# Patient Record
Sex: FEMALE | Race: BLACK OR AFRICAN AMERICAN | Employment: UNEMPLOYED | URBAN - METROPOLITAN AREA
[De-identification: names, ages, dates, MRNs, and addresses within clinical notes are randomized per-mention and may not be internally consistent; named-entity substitution may affect disease eponyms.]

---
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
## 2025-05-13 ENCOUNTER — HOSPITAL ENCOUNTER (EMERGENCY)
Age: 20
Discharge: HOME | End: 2025-05-13
Attending: EMERGENCY MEDICINE
Payer: MEDICAID

## 2025-05-13 VITALS
SYSTOLIC BLOOD PRESSURE: 112 MMHG | RESPIRATION RATE: 18 BRPM | HEART RATE: 80 BPM | OXYGEN SATURATION: 100 % | DIASTOLIC BLOOD PRESSURE: 59 MMHG | TEMPERATURE: 98.4 F

## 2025-05-13 DIAGNOSIS — Z20.2 POSSIBLE EXPOSURE TO STI: Primary | ICD-10-CM

## 2025-05-13 LAB
CANDIDA SPECIES: NEGATIVE
GARDNERELLA VAGINALIS: POSITIVE
HCG SERPL QL: NEGATIVE
HIV 1+2 AB+HIV1 P24 AG SERPL QL IA: NONREACTIVE
SOURCE: ABNORMAL
T PALLIDUM AB SER QL IA: NONREACTIVE
TRICHOMONAS: NEGATIVE

## 2025-05-13 PROCEDURE — 87480 CANDIDA DNA DIR PROBE: CPT

## 2025-05-13 PROCEDURE — 87389 HIV-1 AG W/HIV-1&-2 AB AG IA: CPT

## 2025-05-13 PROCEDURE — 84703 CHORIONIC GONADOTROPIN ASSAY: CPT

## 2025-05-13 PROCEDURE — 87660 TRICHOMONAS VAGIN DIR PROBE: CPT

## 2025-05-13 PROCEDURE — 87510 GARDNER VAG DNA DIR PROBE: CPT

## 2025-05-13 PROCEDURE — 87591 N.GONORRHOEAE DNA AMP PROB: CPT

## 2025-05-13 PROCEDURE — 87491 CHLMYD TRACH DNA AMP PROBE: CPT

## 2025-05-13 PROCEDURE — 99283 EMERGENCY DEPT VISIT LOW MDM: CPT

## 2025-05-13 PROCEDURE — 86780 TREPONEMA PALLIDUM: CPT

## 2025-05-13 ASSESSMENT — ENCOUNTER SYMPTOMS
ABDOMINAL PAIN: 0
NAUSEA: 0
SHORTNESS OF BREATH: 0
VOMITING: 0
BACK PAIN: 0
DIARRHEA: 0

## 2025-05-13 NOTE — ED NOTES
Patient presents to ED for vaginal discharge and concern for STD  Reports that she does not have an Obgyn that she follows with   Denies any concern for pregnancy  Patient a/o x 4 with NAD noted

## 2025-05-13 NOTE — ED PROVIDER NOTES
[facility] EMERGENCY DEPARTMENT     Emergency Department     Faculty Attestation    I performed a history and physical examination of the patient and discussed management with the resident. I reviewed the resident’s note and agree with the documented findings and plan of care. Any areas of disagreement are noted on the chart. I was personally present for the key portions of any procedures. I have documented in the chart those procedures where I was not present during the key portions. I have reviewed the emergency nurses triage note. I agree with the chief complaint, past medical history, past surgical history, allergies, medications, social and family history as documented unless otherwise noted below. For Physician Assistant/ Nurse Practitioner cases/documentation I have personally evaluated this patient and have completed at least one if not all key elements of the E/M (history, physical exam, and MDM). Additional findings are as noted.    3:42 PM EDT    Patient presents requesting STD check.  She states that she has been having some vaginal discharge for a while.  She denies any abdominal pain.  She denies any vaginal pain or discomfort.  She denies any abnormal bleeding.  She denies dysuria.  On exam, patient is resting comfortably in the bed and appears well.  Abdomen is soft and nontender.  The resident perform a pelvic exam.  Will check labs.      Jackie Valdez MD  Attending Emergency  Physician

## 2025-05-13 NOTE — ED PROVIDER NOTES
[facility] EMERGENCY DEPARTMENT  Emergency Department Encounter  Emergency Medicine Resident     Pt Name:Esme Barber  MRN: [id number]  Birthdate 2005  Date of evaluation: 5/13/25  PCP:  Georgina Ferrara APRN - CNP  Note Started: 3:02 PM EDT      CHIEF COMPLAINT       Chief Complaint   Patient presents with    Exposure to STD       HISTORY OF PRESENT ILLNESS  (Location/Symptom, Timing/Onset, Context/Setting, Quality, Duration, Modifying Factors, Severity.)      Esme Barber is a 19 y.o. female who presents to the ED with c/o STI exposure.  Patient states she is sexually active with 1 male partner and does use protection.  She has been having intermittent vaginal discharge for several months.  Patient has not seen any provider for the symptoms.  She came in today to \"get tested for everything.\"  Patient states she \"wants to keep up with this.\"  Patient does not have a PCP or OB/GYN that she follows with.  She denies any fever, chest pain, shortness of breath, abdominal pain, nausea/vomiting, vaginal bleeding, genital lesions/rash, dysuria, hematuria.  Last menstrual period was 5/4.    PAST MEDICAL / SURGICAL / SOCIAL / FAMILY HISTORY      has no past medical history on file.     has no past surgical history on file.    Social History     Socioeconomic History    Marital status: Single     Spouse name: Not on file    Number of children: Not on file    Years of education: Not on file    Highest education level: Not on file   Occupational History    Not on file   Tobacco Use    Smoking status: Never    Smokeless tobacco: Never   Vaping Use    Vaping status: Never Used   Substance and Sexual Activity    Alcohol use: Yes    Drug use: Yes     Types: Marijuana (Weed)     Comment: daily    Sexual activity: Yes     Partners: Male   Other Topics Concern    Not on file   Social History Narrative    Not on file     Social Drivers of Health     Financial Resource Strain: Not on file   Food Insecurity:

## 2025-05-13 NOTE — DISCHARGE INSTRUCTIONS
You have been evaluated due to exposure to sexually transmitted infection.  At this time, you have declined to wait for your test results to come back.  [facility]  If they come back positive, please go to [facility] practice, OB/GYN, or come back to the ER for treatment.    Return to the emergency department if you develop fever, abdominal pain, new or worsening vaginal symptoms, urinary symptoms such as pain or burning when you urinate, or any other concerns.

## 2025-05-14 LAB
C TRACH DNA SPEC QL PROBE+SIG AMP: NEGATIVE
N GONORRHOEA DNA SPEC QL PROBE+SIG AMP: ABNORMAL
SPECIMEN DESCRIPTION: ABNORMAL

## 2025-05-15 ENCOUNTER — TELEPHONE (OUTPATIENT)
Age: 20
End: 2025-05-15

## 2025-05-15 NOTE — TELEPHONE ENCOUNTER
Medication Management Service ([facility])  CLINICAL PHARMACY NOTE:  Telephone Follow-up for Positive STD Test    At the time of Esme Barber's visit to [facility] Emergency Department on 05/13/2025 STD testing was performed. DNA testing was positive for Gonorrhea.       Pregnancy status: not pregnant.    Attempt made to reach patient by telephone to review results and need to return for antibiotic therapy initiation. Phone number was not accepting calls at the time of outreach, so pharmacist was unable to leave a voicemail for the patient. Will attempt to reach out to patient later today.    Chino Stewart (Robbie), PharmD  PGY2 Ambulatory Care Pharmacy Resident  [facility] Medication Management Service  [phone]  05/15/25  =======================================  For Pharmacy Admin Tracking Only    Total # of Interventions Recommended: 0  Total # of Interventions Accepted: 0  Time Spent (min): 10

## 2025-05-16 ENCOUNTER — TELEPHONE (OUTPATIENT)
Age: 20
End: 2025-05-16

## 2025-05-16 NOTE — TELEPHONE ENCOUNTER
Attempt made to reach patient by telephone to review results of STD testing.  Left voice message for return call to [phone].

## 2025-05-16 NOTE — TELEPHONE ENCOUNTER
CLINICAL PHARMACY NOTE:  Telephone Follow-up for Positive STD Test    At the time of Esme Barber's visit to [facility] Emergency Department on 2025 STD testing was performed. DNA testing was positive for  Gonorrhea.   Patient also tested positive for GARDNERELLA VAGINALIS     Pregnancy status:  negative.      Unable to reach patient by phone. Sent letter to patient on 2025 regarding need to start antibiotic therapy and receive an injection to treat the infection. Advised patient to go to the [facility], their local urgent care/ED, or return to the [place] Emergency Department for treatment.     Kim PAREDES. Ph., CACP, Clinical Pharmacist  Anticoagulation Services, [facility]  2025  9:28 AM      For Pharmacy Admin Tracking Only    Intervention Detail: Adherence Monitorin  Total # of Interventions Recommended: 1  Total # of Interventions Accepted: 1  Time Spent (min): 15

## 2025-06-12 ENCOUNTER — HOSPITAL ENCOUNTER (EMERGENCY)
Age: 20
Discharge: HOME | End: 2025-06-12
Attending: EMERGENCY MEDICINE
Payer: COMMERCIAL

## 2025-06-12 VITALS
TEMPERATURE: 98.4 F | SYSTOLIC BLOOD PRESSURE: 115 MMHG | RESPIRATION RATE: 18 BRPM | DIASTOLIC BLOOD PRESSURE: 66 MMHG | HEART RATE: 103 BPM | OXYGEN SATURATION: 100 %

## 2025-06-12 DIAGNOSIS — L91.0 KELOID: Primary | ICD-10-CM

## 2025-06-12 PROCEDURE — 99283 EMERGENCY DEPT VISIT LOW MDM: CPT

## 2025-06-13 NOTE — ED PROVIDER NOTES
[facility] EMERGENCY DEPARTMENT  Emergency Department Encounter  Emergency Medicine Resident     Pt Name:Esme Barber  MRN: [id number]  Birthdate 2005  Date of evaluation: 6/12/25  PCP:  Georgina Ferrara APRN - CNP  Note Started: 10:54 PM EDT      CHIEF COMPLAINT       No chief complaint on file.      HISTORY OF PRESENT ILLNESS  (Location/Symptom, Timing/Onset, Context/Setting, Quality, Duration, Modifying Factors, Severity.)      Esme Barber is a 19 y.o. female who presents with concern for possible infection over tattoo.  Patient states tattoo was done over 2 years ago.  Has been experiencing raised growth from tattoo which started about 1.5 years ago.  States tattoo is itchy.  Denies fever, chills, purulent drainage, redness, swelling, nausea, vomiting.    PAST MEDICAL / SURGICAL / SOCIAL / FAMILY HISTORY      has no past medical history on file.       has no past surgical history on file.      Social History     Socioeconomic History    Marital status: Single     Spouse name: Not on file    Number of children: Not on file    Years of education: Not on file    Highest education level: Not on file   Occupational History    Not on file   Tobacco Use    Smoking status: Never    Smokeless tobacco: Never   Vaping Use    Vaping status: Never Used   Substance and Sexual Activity    Alcohol use: Yes    Drug use: Yes     Types: Marijuana (Weed)     Comment: daily    Sexual activity: Yes     Partners: Male   Other Topics Concern    Not on file   Social History Narrative    Not on file     Social Drivers of Health     Financial Resource Strain: Not on file   Food Insecurity: No Food Insecurity (2/19/2025)    Received from [facility]    Hunger Screening     Within the past 12 months we worried whether our food would run out before we got money to buy more.: Never True     Within the past 12 months the food we bought just didn't last and we didn't have money to get more.: Never True

## 2025-06-13 NOTE — ED PROVIDER NOTES
[facility] EMERGENCY DEPARTMENT     Emergency Department     Faculty Attestation        I performed a history and physical examination of the patient and discussed management with the resident. I reviewed the resident’s note and agree with the documented findings and plan of care. Any areas of disagreement are noted on the chart. I was personally present for the key portions of any procedures. I have documented in the chart those procedures where I was not present during the key portions. I have reviewed the emergency nurses triage note. I agree with the chief complaint, past medical history, past surgical history, allergies, medications, social and family history as documented unless otherwise noted below.  For Physician Assistant/ Nurse Practitioner cases/documentation I have personally evaluated this patient and have completed at least one if not all key elements of the E/M (history, physical exam, and MDM). Additional findings are as noted.      Vital Signs: BP: 115/66  Pulse: (!) 114  Respirations: 18  Temp: 98.4 °F (36.9 °C) SpO2: 100 %  PCP:  Georgina Ferrara, APRN - CNP  Note Started: 6/12/25, 11:14 PM EDT    Pertinent Comments:         Critical Care  None      (Please note that portions of this note were completed with a voice recognition program. Efforts were made to edit the dictations but occasionally words are mis-transcribed. Whenever words are used in this note in any gender, they shall be construed as though they were used in the gender appropriate to the circumstances; and whenever words are used in this note in the singular or plural form, they shall be construed as though they were used in the form appropriate to the circumstances.)    Gurpreet Frias MD [facility]  Attending Emergency Medicine Physician            Gurpreet Frias MD  06/12/25 6722

## 2025-06-13 NOTE — DISCHARGE INSTRUCTIONS
You were evaluated in the emergency department for a skin reaction over your tattoo.  You likely have what is called a keloid which is excessive overgrowth of scar tissue.  You were given referral to see Dr. Araya with dermatology for further evaluation of your tattoo.  Schedule an appointment as soon as possible for further management.  You can also follow-up with your PCP for further management, schedule an appointment within 1 week.    Return to the emergency department experience worsening symptoms, high fever, drainage from your tattoo, it becomes red hot or swollen, numbness or tingling, or you have any new concerns.

## 2025-07-02 ENCOUNTER — HOSPITAL ENCOUNTER (EMERGENCY)
Age: 20
Discharge: HOME | End: 2025-07-02
Attending: EMERGENCY MEDICINE
Payer: MEDICAID

## 2025-07-02 VITALS
HEART RATE: 90 BPM | RESPIRATION RATE: 18 BRPM | SYSTOLIC BLOOD PRESSURE: 130 MMHG | DIASTOLIC BLOOD PRESSURE: 68 MMHG | TEMPERATURE: 97.5 F | OXYGEN SATURATION: 100 %

## 2025-07-02 DIAGNOSIS — B96.89 BACTERIAL VAGINOSIS: ICD-10-CM

## 2025-07-02 DIAGNOSIS — Z20.2 EXPOSURE TO STD: Primary | ICD-10-CM

## 2025-07-02 DIAGNOSIS — N76.0 BACTERIAL VAGINOSIS: ICD-10-CM

## 2025-07-02 LAB
BACTERIA URNS QL MICRO: NORMAL
BILIRUB UR QL STRIP: NEGATIVE
CANDIDA SPECIES: NEGATIVE
CASTS #/AREA URNS LPF: NORMAL /LPF (ref 0–8)
CLARITY UR: CLEAR
COLOR UR: YELLOW
EPI CELLS #/AREA URNS HPF: NORMAL /HPF (ref 0–5)
GARDNERELLA VAGINALIS: POSITIVE
GLUCOSE UR STRIP-MCNC: NEGATIVE MG/DL
HCG UR QL: NEGATIVE
HGB UR QL STRIP.AUTO: NEGATIVE
KETONES UR STRIP-MCNC: NEGATIVE MG/DL
LEUKOCYTE ESTERASE UR QL STRIP: ABNORMAL
NITRITE UR QL STRIP: NEGATIVE
PH UR STRIP: 7.5 [PH] (ref 5–8)
PROT UR STRIP-MCNC: NEGATIVE MG/DL
RBC #/AREA URNS HPF: NORMAL /HPF (ref 0–4)
SOURCE: ABNORMAL
SP GR UR STRIP: 1.01 (ref 1–1.03)
TRICHOMONAS: NEGATIVE
UROBILINOGEN UR STRIP-ACNC: NORMAL EU/DL (ref 0–1)
WBC #/AREA URNS HPF: NORMAL /HPF (ref 0–5)

## 2025-07-02 PROCEDURE — 87660 TRICHOMONAS VAGIN DIR PROBE: CPT

## 2025-07-02 PROCEDURE — 6370000000 HC RX 637 (ALT 250 FOR IP)

## 2025-07-02 PROCEDURE — 6360000002 HC RX W HCPCS

## 2025-07-02 PROCEDURE — 87480 CANDIDA DNA DIR PROBE: CPT

## 2025-07-02 PROCEDURE — 81025 URINE PREGNANCY TEST: CPT

## 2025-07-02 PROCEDURE — 99283 EMERGENCY DEPT VISIT LOW MDM: CPT

## 2025-07-02 PROCEDURE — 87591 N.GONORRHOEAE DNA AMP PROB: CPT

## 2025-07-02 PROCEDURE — 81001 URINALYSIS AUTO W/SCOPE: CPT

## 2025-07-02 PROCEDURE — 87491 CHLMYD TRACH DNA AMP PROBE: CPT

## 2025-07-02 PROCEDURE — 96372 THER/PROPH/DIAG INJ SC/IM: CPT

## 2025-07-02 PROCEDURE — 87510 GARDNER VAG DNA DIR PROBE: CPT

## 2025-07-02 RX ORDER — DOXYCYCLINE HYCLATE 100 MG
100 TABLET ORAL 2 TIMES DAILY
Qty: 14 TABLET | Refills: 0 | Status: SHIPPED | OUTPATIENT
Start: 2025-07-02 | End: 2025-07-09

## 2025-07-02 RX ORDER — METRONIDAZOLE 500 MG/1
500 TABLET ORAL ONCE
Status: COMPLETED | OUTPATIENT
Start: 2025-07-02 | End: 2025-07-02

## 2025-07-02 RX ORDER — CEFTRIAXONE 500 MG/1
500 INJECTION, POWDER, FOR SOLUTION INTRAMUSCULAR; INTRAVENOUS ONCE
Status: COMPLETED | OUTPATIENT
Start: 2025-07-02 | End: 2025-07-02

## 2025-07-02 RX ORDER — DOXYCYCLINE HYCLATE 100 MG
100 TABLET ORAL ONCE
Status: COMPLETED | OUTPATIENT
Start: 2025-07-02 | End: 2025-07-02

## 2025-07-02 RX ORDER — ONDANSETRON 4 MG/1
4 TABLET, ORALLY DISINTEGRATING ORAL 3 TIMES DAILY PRN
Qty: 21 TABLET | Refills: 0 | Status: SHIPPED | OUTPATIENT
Start: 2025-07-02

## 2025-07-02 RX ORDER — METRONIDAZOLE 500 MG/1
500 TABLET ORAL 2 TIMES DAILY
Qty: 14 TABLET | Refills: 0 | Status: SHIPPED | OUTPATIENT
Start: 2025-07-02 | End: 2025-07-09

## 2025-07-02 RX ADMIN — CEFTRIAXONE SODIUM 500 MG: 500 INJECTION, POWDER, FOR SOLUTION INTRAMUSCULAR; INTRAVENOUS at 16:59

## 2025-07-02 RX ADMIN — DOXYCYCLINE HYCLATE 100 MG: 100 TABLET, COATED ORAL at 16:58

## 2025-07-02 RX ADMIN — METRONIDAZOLE 500 MG: 500 TABLET ORAL at 16:58

## 2025-07-02 ASSESSMENT — ENCOUNTER SYMPTOMS
NAUSEA: 0
ABDOMINAL PAIN: 0
VOMITING: 0

## 2025-07-02 NOTE — ED PROVIDER NOTES
[facility] EMERGENCY DEPARTMENT  Emergency Department Encounter  Emergency Medicine Resident     Pt Name:Esme Barber  MRN: [id number]  Birthdate 2005  Date of evaluation: 7/2/25  PCP:  Georgina Ferrara APRN - CNP  Note Started: 3:06 PM EDT      CHIEF COMPLAINT       No chief complaint on file.      HISTORY OF PRESENT ILLNESS  (Location/Symptom, Timing/Onset, Context/Setting, Quality, Duration, Modifying Factors, Severity.)      Esme Barber is a 19 y.o. female who presents with concerns for STD.  Patient reports that she been having some white vaginal discharge for the last few weeks, she was evaluated in the emergency department approximately 2 months ago and was [facility]  She received a letter in the mail that she was positive for BV and gonorrhea.  Patient reports that she was unable to get any medications to treat these over the last few weeks and therefore decided to present to the emergency department for repeat testing and treatment.  Patient reports that she is still having some intermittent white vaginal discharge and dysuria but denies hematuria.  She states that she is due for her next menstrual cycle but cannot remember when her last one was specifically.  She does report ongoing unprotected sexual intercourse and does not use any birth control.  She denies abdominal pain, nausea, vomiting.  No unusual vaginal bleeding.    PAST MEDICAL / SURGICAL / SOCIAL / FAMILY HISTORY      has no past medical history on file.       has no past surgical history on file.      Social History     Socioeconomic History    Marital status: Single     Spouse name: Not on file    Number of children: Not on file    Years of education: Not on file    Highest education level: Not on file   Occupational History    Not on file   Tobacco Use    Smoking status: Some Days     Types: Cigars    Smokeless tobacco: Never   Vaping Use    Vaping status: Never

## 2025-07-02 NOTE — ED NOTES
Pt to ED via triage with c/o exposure to STD. Pt states she was tested for STD and was informed she tested positive for STI. Pt is a/ox4, ambulatory, RR even and non labored on room air, call light in reach.

## 2025-07-02 NOTE — DISCHARGE INSTRUCTIONS
Thank you for visiting [facility] Emergency Department.    You do not have any signs of urinary tract infection.  Your urine pregnancy test was negative.  Your chlamydia and still pending we will treat you because you are positive for this in the past.    You do also have bacterial vaginosis.  This is not considered a sexually transmitted infection but can cause some vaginal discharge.  You will be treated with Flagyl for this.  Please take the Flagyl until completed.  Do not drink alcohol while taking this medication.  It may make you slightly nauseous, you were prescribed Zofran to take as needed if you develop any nausea with the antibiotic.    Please take the doxycycline until it is completed.  Do not have any unprotected sexual intercourse until this is done.  Please reach out to any of your sexual partners to have them get tested as well.    Return to the emergency department if you develop any significantly worsening symptoms.    You need to call Georgina Ferrara APRN - CNP to make an appointment as directed for follow up.    Should you have any questions regarding your care or further treatment, please call [facility] Emergency Department at [phone].

## 2025-07-02 NOTE — ED PROVIDER NOTES
[facility]     Emergency Department     Faculty Attestation    I performed a history and physical examination of the patient and discussed management with the resident. I reviewed the resident’s note and agree with the documented findings and plan of care. Any areas of disagreement are noted on the chart. I was personally present for the key portions of any procedures. I have documented in the chart those procedures where I was not present during the key portions. I have reviewed the emergency nurses triage note. I agree with the chief complaint, past medical history, past surgical history, allergies, medications, social and family history as documented unless otherwise noted below. Documentation of the HPI, Physical Exam and Medical Decision Making performed by medical students or scribes is based on my personal performance of the HPI, PE and MDM. For Physician Assistant/ Nurse Practitioner cases/documentation I have personally evaluated this patient and have completed at least one if not all key elements of the E/M (history, physical exam, and MDM). Additional findings are as noted.    Vital signs:   Vitals:    07/02/25 1505   BP:    Pulse:    Resp: 18   Temp:    SpO2:       Patient here with white vaginal discharge. Recently tested in the ED and was positive for gonorrhea and not treated. Abdomen soft and non-tender.             Domi Wren M.D,  Attending Emergency  Physician           Domi Wren MD  07/02/25 1511

## 2025-07-03 LAB
C TRACH DNA SPEC QL PROBE+SIG AMP: NEGATIVE
N GONORRHOEA DNA SPEC QL PROBE+SIG AMP: NEGATIVE
SPECIMEN DESCRIPTION: NORMAL

## 2025-08-06 ENCOUNTER — HOSPITAL ENCOUNTER (EMERGENCY)
Age: 20
Discharge: HOME | End: 2025-08-06
Attending: STUDENT IN AN ORGANIZED HEALTH CARE EDUCATION/TRAINING PROGRAM
Payer: MEDICAID

## 2025-08-06 VITALS
DIASTOLIC BLOOD PRESSURE: 60 MMHG | HEART RATE: 55 BPM | SYSTOLIC BLOOD PRESSURE: 113 MMHG | TEMPERATURE: 98.4 F | OXYGEN SATURATION: 100 %

## 2025-08-06 DIAGNOSIS — T78.49XA ALLERGIC REACTION TO TATTOO INK: Primary | ICD-10-CM

## 2025-08-06 PROCEDURE — 99283 EMERGENCY DEPT VISIT LOW MDM: CPT | Performed by: STUDENT IN AN ORGANIZED HEALTH CARE EDUCATION/TRAINING PROGRAM

## 2025-08-06 RX ORDER — TRIAMCINOLONE ACETONIDE 1 MG/G
CREAM TOPICAL
Qty: 1 EACH | Refills: 0 | Status: SHIPPED | OUTPATIENT
Start: 2025-08-06 | End: 2025-08-06

## 2025-08-06 RX ORDER — TRIAMCINOLONE ACETONIDE 1 MG/G
CREAM TOPICAL ONCE
Status: DISCONTINUED | OUTPATIENT
Start: 2025-08-06 | End: 2025-08-06 | Stop reason: HOSPADM

## 2025-08-06 RX ORDER — CETIRIZINE HYDROCHLORIDE 10 MG/1
10 TABLET ORAL ONCE
Status: DISCONTINUED | OUTPATIENT
Start: 2025-08-06 | End: 2025-08-06 | Stop reason: HOSPADM

## 2025-08-06 RX ORDER — TRIAMCINOLONE ACETONIDE 1 MG/G
CREAM TOPICAL
Qty: 1 EACH | Refills: 0 | Status: SHIPPED | OUTPATIENT
Start: 2025-08-06